# Patient Record
Sex: FEMALE | Race: WHITE | URBAN - METROPOLITAN AREA
[De-identification: names, ages, dates, MRNs, and addresses within clinical notes are randomized per-mention and may not be internally consistent; named-entity substitution may affect disease eponyms.]

---

## 2024-09-19 ENCOUNTER — LAB OUTSIDE AN ENCOUNTER (OUTPATIENT)
Dept: URBAN - METROPOLITAN AREA CLINIC 92 | Facility: CLINIC | Age: 44
End: 2024-09-19

## 2024-09-19 ENCOUNTER — DASHBOARD ENCOUNTERS (OUTPATIENT)
Age: 44
End: 2024-09-19

## 2024-09-19 ENCOUNTER — OFFICE VISIT (OUTPATIENT)
Dept: URBAN - METROPOLITAN AREA CLINIC 92 | Facility: CLINIC | Age: 44
End: 2024-09-19
Payer: COMMERCIAL

## 2024-09-19 VITALS
DIASTOLIC BLOOD PRESSURE: 80 MMHG | WEIGHT: 131 LBS | HEART RATE: 68 BPM | BODY MASS INDEX: 19.4 KG/M2 | TEMPERATURE: 97.1 F | SYSTOLIC BLOOD PRESSURE: 124 MMHG | HEIGHT: 69 IN

## 2024-09-19 DIAGNOSIS — K58.0 IRRITABLE BOWEL SYNDROME WITH DIARRHEA: ICD-10-CM

## 2024-09-19 DIAGNOSIS — R14.0 ABDOMINAL BLOATING: ICD-10-CM

## 2024-09-19 DIAGNOSIS — Z12.11 COLON CANCER SCREENING: ICD-10-CM

## 2024-09-19 PROBLEM — 197125005: Status: ACTIVE | Noted: 2024-09-19

## 2024-09-19 PROCEDURE — 99203 OFFICE O/P NEW LOW 30 MIN: CPT

## 2024-09-19 RX ORDER — ESCITALOPRAM OXALATE 10 MG/1
1 TABLET TABLET ORAL ONCE A DAY
Status: ACTIVE | COMMUNITY

## 2024-09-19 NOTE — HPI-TODAY'S VISIT:
Patient was referred by Dr. Baltazar for colon cancer screening. A copy of this document will be sent to the provider.  Patient presents for initial screening colonoscopy. Denies any prior examination. Denies family history of colon cancer.  Patient also wishes to discuss diarrhea today. No melena, rectal bleeding, or unintentional weight loss. Patient denies cardiopulmonary disease and does not have a pacemaker or defibrillator. Denies use of anticoagulants or antiplatelets. Recent labs and imaging were unremarkable.  She reports GI issues for most of her life. She gets periods of diarrhea intermittently with associates abdominal bloating and discomfort. Over the years, she has had several labs done including blood work and stool studies, all completely wnl. Recently she went to [facility] to discuss a colonoscopy but was unable to get one until February. She typically has symptoms in the morning only and has 2-3 watery bowel movements. Occasionally she notices mucous but never blood. Occasional rectal discomfort. Her bloating and pain does tend to improve with each bowel movement. Often alcohol, spicy foods and fatty foods trigger her symptoms. She denies N/V. No epigastric pain, acid reflux or heartburn. No urgency or fecal incontinence/leaking. No belching. She never has nocturnal symptoms. No family history of malignancy or IBD. After seeing a provider at [facility], she did start IBGuard and says this has helped a lot with her discomfort. Imodium usually always stops her diarrhea, but then she can go a day or up to 2 days without a stool after taking the medication. She recently started Seed supplements but notices a grumbling sensation and increased frequency of stools with this, however, they are more formed when she takes it. Overall, she says it does not significantly impact her every day life and she is able to still participate in events such as concerts and tennis. When she is symptomatic, she will have symptoms on a daily basis, sometimes for weeks. The longest period of time was 2 months after the birth of her son. She states she is otherwise doing well.

## 2024-10-11 ENCOUNTER — CLAIMS CREATED FROM THE CLAIM WINDOW (OUTPATIENT)
Dept: URBAN - METROPOLITAN AREA SURGERY CENTER 16 | Facility: SURGERY CENTER | Age: 44
End: 2024-10-11
Payer: COMMERCIAL

## 2024-10-11 ENCOUNTER — CLAIMS CREATED FROM THE CLAIM WINDOW (OUTPATIENT)
Dept: URBAN - METROPOLITAN AREA CLINIC 4 | Facility: CLINIC | Age: 44
End: 2024-10-11
Payer: COMMERCIAL

## 2024-10-11 DIAGNOSIS — K58.9 IRRITABLE BOWEL SYNDROME, UNSPECIFIED TYPE: ICD-10-CM

## 2024-10-11 DIAGNOSIS — Z80.0 FAMILY HISTORY OF COLON CANCER: ICD-10-CM

## 2024-10-11 DIAGNOSIS — K64.8 INTERNAL HEMORRHOIDS: ICD-10-CM

## 2024-10-11 DIAGNOSIS — K52.832 LYMPHOCYTIC COLITIS: ICD-10-CM

## 2024-10-11 DIAGNOSIS — R19.7 DIARRHEA, UNSPECIFIED TYPE: ICD-10-CM

## 2024-10-11 DIAGNOSIS — K64.0 GRADE I HEMORRHOIDS: ICD-10-CM

## 2024-10-11 PROCEDURE — 88313 SPECIAL STAINS GROUP 2: CPT | Performed by: PATHOLOGY

## 2024-10-11 PROCEDURE — 88305 TISSUE EXAM BY PATHOLOGIST: CPT | Performed by: PATHOLOGY

## 2024-10-11 PROCEDURE — 00812 ANES LWR INTST SCR COLSC: CPT | Performed by: NURSE ANESTHETIST, CERTIFIED REGISTERED

## 2024-10-11 PROCEDURE — 45380 COLONOSCOPY AND BIOPSY: CPT | Performed by: INTERNAL MEDICINE

## 2024-10-11 PROCEDURE — 88342 IMHCHEM/IMCYTCHM 1ST ANTB: CPT | Performed by: PATHOLOGY

## 2024-10-11 PROCEDURE — 00811 ANES LWR INTST NDSC NOS: CPT | Performed by: NURSE ANESTHETIST, CERTIFIED REGISTERED

## 2024-11-26 ENCOUNTER — OFFICE VISIT (OUTPATIENT)
Dept: URBAN - METROPOLITAN AREA CLINIC 92 | Facility: CLINIC | Age: 44
End: 2024-11-26

## 2024-11-26 PROBLEM — 1187071008: Status: ACTIVE | Noted: 2024-11-26

## 2024-11-26 RX ORDER — ESCITALOPRAM OXALATE 10 MG/1
1 TABLET TABLET ORAL ONCE A DAY
Status: ACTIVE | COMMUNITY

## 2024-11-26 NOTE — HPI-TODAY'S VISIT:
Patient is a 44 year old female who presents in follow up from her colonoscopy. Referred by Dr. Baltazar for colon cancer screening. A copy of this document will be sent to the provider.  Patient presents for initial screening colonoscopy. Denies any prior examination. Denies family history of colon cancer.  Patient also wishes to discuss diarrhea today. No melena, rectal bleeding, or unintentional weight loss. Patient denies cardiopulmonary disease and does not have a pacemaker or defibrillator. Denies use of anticoagulants or antiplatelets. Recent labs and imaging were unremarkable.  9/19/24, She reports GI issues for most of her life. She gets periods of diarrhea intermittently with associates abdominal bloating and discomfort. Over the years, she has had several labs done including blood work and stool studies, all completely wnl. Recently she went to [facility] to discuss a colonoscopy but was unable to get one until February. She typically has symptoms in the morning only and has 2-3 watery bowel movements. Occasionally she notices mucous but never blood. Occasional rectal discomfort. Her bloating and pain does tend to improve with each bowel movement. Often alcohol, spicy foods and fatty foods trigger her symptoms. She denies N/V. No epigastric pain, acid reflux or heartburn. No urgency or fecal incontinence/leaking. No belching. She never has nocturnal symptoms. No family history of malignancy or IBD. After seeing a provider at [facility], she did start IBGuard and says this has helped a lot with her discomfort. Imodium usually always stops her diarrhea, but then she can go a day or up to 2 days without a stool after taking the medication. She recently started Seed supplements but notices a grumbling sensation and increased frequency of stools with this, however, they are more formed when she takes it. Overall, she says it does not significantly impact her every day life and she is able to still participate in events such as concerts and tennis. When she is symptomatic, she will have symptoms on a daily basis, sometimes for weeks. The longest period of time was 2 months after the birth of her son. She states she is otherwise doing well.  Colonoscopy 10/11/24 with Dr. Mat Wheat revealed IH, otherwise normal, random colon bx show lymphocytic colitis, 10 yr repeat. Test  for celiac

## 2024-12-09 ENCOUNTER — OFFICE VISIT (OUTPATIENT)
Dept: URBAN - METROPOLITAN AREA CLINIC 92 | Facility: CLINIC | Age: 44
End: 2024-12-09

## 2024-12-09 RX ORDER — ESCITALOPRAM OXALATE 10 MG/1
1 TABLET TABLET ORAL ONCE A DAY
Status: ACTIVE | COMMUNITY

## 2024-12-09 NOTE — HPI-TODAY'S VISIT:
Patient is a 44 year old female who presents in follow up from her colonoscopy. Referred by Dr. Baltazar for colon cancer screening. A copy of this document will be sent to the provider.  Patient presents for initial screening colonoscopy. Denies any prior examination. Denies family history of colon cancer.  Patient also wishes to discuss diarrhea today. No melena, rectal bleeding, or unintentional weight loss. Patient denies cardiopulmonary disease and does not have a pacemaker or defibrillator. Denies use of anticoagulants or antiplatelets. Recent labs and imaging were unremarkable.  9/19/24, She reports GI issues for most of her life. She gets periods of diarrhea intermittently with associates abdominal bloating and discomfort. Over the years, she has had several labs done including blood work and stool studies, all completely wnl. Recently she went to [facility] to discuss a colonoscopy but was unable to get one until February. She typically has symptoms in the morning only and has 2-3 watery bowel movements. Occasionally she notices mucous but never blood. Occasional rectal discomfort. Her bloating and pain does tend to improve with each bowel movement. Often alcohol, spicy foods and fatty foods trigger her symptoms. She denies N/V. No epigastric pain, acid reflux or heartburn. No urgency or fecal incontinence/leaking. No belching. She never has nocturnal symptoms. No family history of malignancy or IBD. After seeing a provider at [facility], she did start IBGuard and says this has helped a lot with her discomfort. Imodium usually always stops her diarrhea, but then she can go a day or up to 2 days without a stool after taking the medication. She recently started Seed supplements but notices a grumbling sensation and increased frequency of stools with this, however, they are more formed when she takes it. Overall, she says it does not significantly impact her every day life and she is able to still participate in events such as concerts and tennis. When she is symptomatic, she will have symptoms on a daily basis, sometimes for weeks. The longest period of time was 2 months after the birth of her son. She states she is otherwise doing well.  Colonoscopy 10/11/24 with Dr. Mat Wheat revealed IH, otherwise normal, random colon bx show lymphocytic colitis, 10 yr repeat. Test  for celiac

## 2024-12-29 ENCOUNTER — WEB ENCOUNTER (OUTPATIENT)
Dept: URBAN - METROPOLITAN AREA CLINIC 92 | Facility: CLINIC | Age: 44
End: 2024-12-29

## 2024-12-30 ENCOUNTER — OFFICE VISIT (OUTPATIENT)
Dept: URBAN - METROPOLITAN AREA CLINIC 92 | Facility: CLINIC | Age: 44
End: 2024-12-30

## 2024-12-30 RX ORDER — ESCITALOPRAM OXALATE 10 MG/1
1 TABLET TABLET ORAL ONCE A DAY
Status: ACTIVE | COMMUNITY

## 2024-12-30 NOTE — HPI-TODAY'S VISIT:
Patient is a 44 year old female who presents in follow up from her colonoscopy. Referred by Dr. Baltazar for colon cancer screening. A copy of this document will be sent to the provider.  Patient presents for initial screening colonoscopy. Denies any prior examination. Denies family history of colon cancer.  Patient also wishes to discuss diarrhea today. No melena, rectal bleeding, or unintentional weight loss. Patient denies cardiopulmonary disease and does not have a pacemaker or defibrillator. Denies use of anticoagulants or antiplatelets. Recent labs and imaging were unremarkable.  9/19/24, She reports GI issues for most of her life. She gets periods of diarrhea intermittently with associates abdominal bloating and discomfort. Over the years, she has had several labs done including blood work and stool studies, all completely wnl. Recently she went to [facility] to discuss a colonoscopy but was unable to get one until February. She typically has symptoms in the morning only and has 2-3 watery bowel movements. Occasionally she notices mucous but never blood. Occasional rectal discomfort. Her bloating and pain does tend to improve with each bowel movement. Often alcohol, spicy foods and fatty foods trigger her symptoms. She denies N/V. No epigastric pain, acid reflux or heartburn. No urgency or fecal incontinence/leaking. No belching. She never has nocturnal symptoms. No family history of malignancy or IBD. After seeing a provider at [facility], she did start IBGuard and says this has helped a lot with her discomfort. Imodium usually always stops her diarrhea, but then she can go a day or up to 2 days without a stool after taking the medication. She recently started Seed supplements but notices a grumbling sensation and increased frequency of stools with this, however, they are more formed when she takes it. Overall, she says it does not significantly impact her every day life and she is able to still participate in events such as concerts and tennis. When she is symptomatic, she will have symptoms on a daily basis, sometimes for weeks. The longest period of time was 2 months after the birth of her son. She states she is otherwise doing well.  Colonoscopy 10/11/24 with Dr. Mat Wheat revealed IH, otherwise normal, random colon bx show lymphocytic colitis, 10 yr repeat. Test  for celiac

## 2025-04-04 ENCOUNTER — OFFICE VISIT (OUTPATIENT)
Dept: URBAN - METROPOLITAN AREA CLINIC 25 | Facility: CLINIC | Age: 45
End: 2025-04-04
Payer: COMMERCIAL

## 2025-04-04 DIAGNOSIS — R19.7 DIARRHEA, UNSPECIFIED TYPE: ICD-10-CM

## 2025-04-04 DIAGNOSIS — K52.832 LYMPHOCYTIC COLITIS: ICD-10-CM

## 2025-04-04 PROCEDURE — 99214 OFFICE O/P EST MOD 30 MIN: CPT

## 2025-04-04 RX ORDER — ESCITALOPRAM OXALATE 10 MG/1
1 TABLET TABLET ORAL ONCE A DAY
Status: ACTIVE | COMMUNITY

## 2025-04-04 RX ORDER — CHOLESTYRAMINE 4 G/9G
1 PACKET MIXED WITH WATER OR NON-CARBONATED DRINK POWDER, FOR SUSPENSION ORAL ONCE A DAY
Qty: 30 | Refills: 3 | OUTPATIENT
Start: 2025-04-04

## 2025-04-04 NOTE — HPI-TODAY'S VISIT:
04/25 OV  S/p colonscopy, which revealed lymphocytic colitis, the patient reports that her diarrhea has improved w/ taking supplements such as holozyme/ and digestive enzymes. She has also adopted a low FODMAP diet w/ good efficacy. She reports immodium use intermittently. No NSAID use, Escitalopram use. She notes symptoms are triggered by gluten, no prior celiac testing. Currently she is having 1-2 BM's a day, loose stools are intermittent nad usually improve w/ supplements and Immodium.    Colon 2024  Internal hemorrhoids. The entire examined colon is normal. Biopsied. - Lymphocytic colitis  The examined portion of the ileum was normal.

## 2025-04-04 NOTE — HPI-OTHER HISTORIES
09/24 OV  Patient is a 44 year old female who presents in follow up from her colonoscopy. Referred by Dr. Baltazar for colon cancer screening. A copy of this document will be sent to the provider.  Patient presents for initial screening colonoscopy. Denies any prior examination. Denies family history of colon cancer. Patient also wishes to discuss diarrhea today. No melena, rectal bleeding, or unintentional weight loss. Patient denies cardiopulmonary disease and does not have a pacemaker or defibrillator. Denies use of anticoagulants or antiplatelets. Recent labs and imaging were unremarkable.  She reports GI issues for most of her life. She gets periods of diarrhea intermittently with associates abdominal bloating and discomfort. Over the years, she has had several labs done including blood work and stool studies, all completely wnl. Recently she went to [facility] to discuss a colonoscopy but was unable to get one until February. She typically has symptoms in the morning only and has 2-3 watery bowel movements. Occasionally she notices mucous but never blood. Occasional rectal discomfort. Her bloating and pain does tend to improve with each bowel movement. Often alcohol, spicy foods and fatty foods trigger her symptoms. She denies N/V. No epigastric pain, acid reflux or heartburn. No urgency or fecal incontinence/leaking. No belching. She never has nocturnal symptoms. No family history of malignancy or IBD. After seeing a provider at [facility], she did start IBGuard and says this has helped a lot with her discomfort. Imodium usually always stops her diarrhea, but then she can go a day or up to 2 days without a stool after taking the medication. She recently started Seed supplements but notices a grumbling sensation and increased frequency of stools with this, however, they are more formed when she takes it. Overall, she says it does not significantly impact her every day life and she is able to still participate in events such as concerts and tennis. When she is symptomatic, she will have symptoms on a daily basis, sometimes for weeks. The longest period of time was 2 months after the birth of her son. She states she is otherwise doing well.

## 2025-07-11 ENCOUNTER — OFFICE VISIT (OUTPATIENT)
Dept: URBAN - METROPOLITAN AREA CLINIC 25 | Facility: CLINIC | Age: 45
End: 2025-07-11